# Patient Record
Sex: MALE | Race: WHITE | NOT HISPANIC OR LATINO
[De-identification: names, ages, dates, MRNs, and addresses within clinical notes are randomized per-mention and may not be internally consistent; named-entity substitution may affect disease eponyms.]

---

## 2021-05-27 ENCOUNTER — APPOINTMENT (OUTPATIENT)
Dept: COLORECTAL SURGERY | Facility: CLINIC | Age: 46
End: 2021-05-27
Payer: COMMERCIAL

## 2021-05-27 VITALS
HEART RATE: 81 BPM | TEMPERATURE: 97.5 F | BODY MASS INDEX: 24.11 KG/M2 | WEIGHT: 150 LBS | OXYGEN SATURATION: 99 % | SYSTOLIC BLOOD PRESSURE: 117 MMHG | DIASTOLIC BLOOD PRESSURE: 65 MMHG | RESPIRATION RATE: 14 BRPM | HEIGHT: 66 IN

## 2021-05-27 DIAGNOSIS — Z78.9 OTHER SPECIFIED HEALTH STATUS: ICD-10-CM

## 2021-05-27 DIAGNOSIS — L29.0 PRURITUS ANI: ICD-10-CM

## 2021-05-27 DIAGNOSIS — Z86.010 PERSONAL HISTORY OF COLONIC POLYPS: ICD-10-CM

## 2021-05-27 DIAGNOSIS — K62.5 HEMORRHAGE OF ANUS AND RECTUM: ICD-10-CM

## 2021-05-27 DIAGNOSIS — K64.9 UNSPECIFIED HEMORRHOIDS: ICD-10-CM

## 2021-05-27 PROCEDURE — 46600 DIAGNOSTIC ANOSCOPY SPX: CPT

## 2021-05-27 PROCEDURE — 99204 OFFICE O/P NEW MOD 45 MIN: CPT | Mod: 25

## 2021-05-27 RX ORDER — HYDROCORTISONE 25 MG/G
2.5 CREAM TOPICAL
Qty: 1 | Refills: 5 | Status: ACTIVE | COMMUNITY
Start: 2021-05-27 | End: 1900-01-01

## 2021-05-27 NOTE — ASSESSMENT
[FreeTextEntry1] : Internal hemorrhoids with occasional bleeding and itching\par -Patient's hemorrhoid disease appears to be mild to moderate and likely will respond to conservative therapy\par -Are recommended patient initiated fiber supplementation daily\par -Hydrocortisone cream 2 times per day for a week followed by as needed\par -Patient and monitor symptoms if no improvement, he will followup in the office for repeat evaluation\par -All questions were answered

## 2021-05-27 NOTE — PHYSICAL EXAM
[Normal Breath Sounds] : Normal breath sounds [Normal Heart Sounds] : normal heart sounds [Normal Rate and Rhythm] : normal rate and rhythm [No Rash or Lesion] : No rash or lesion [Alert] : alert [Oriented to Person] : oriented to person [Oriented to Place] : oriented to place [Oriented to Time] : oriented to time [Calm] : calm [de-identified] : soft, NT/ND, +BS [de-identified] : well nourished male [de-identified] : NC/AT [de-identified] : ABHAY/+ROM [de-identified] : Intact

## 2021-05-27 NOTE — CONSULT LETTER
[Dear  ___] : Dear  [unfilled], [Consult Letter:] : I had the pleasure of evaluating your patient, [unfilled]. [Please see my note below.] : Please see my note below. [Consult Closing:] : Thank you very much for allowing me to participate in the care of this patient.  If you have any questions, please do not hesitate to contact me. [Sincerely,] : Sincerely, [FreeTextEntry2] : Leif Cole [FreeTextEntry3] : Aaron Gray MD FACS\par Chief Colon and Rectal Surgery\par [facility]

## 2021-05-27 NOTE — HISTORY OF PRESENT ILLNESS
[FreeTextEntry1] : Patient is a 45 yo male here with complaints of hemorrhoids.  He states that he feels something on the outside, not really pain just discomfort especially when sitting.  He also complains of itching.  He passes stool 1-2 times a day and denies any straining.  He has very occasional scant bleeding with wiping. Recent colonoscopy was otherwise normal except for small polyp. This was removed. No bowel discomfort no fevers or chills no nausea or vomiting no weight loss or gain. No family history of colon cancer or inflammatory bowel disease

## 2021-06-13 ENCOUNTER — EMERGENCY (EMERGENCY)
Facility: HOSPITAL | Age: 46
LOS: 1 days | Discharge: HOME | End: 2021-06-13
Attending: EMERGENCY MEDICINE
Payer: COMMERCIAL

## 2021-06-13 VITALS
OXYGEN SATURATION: 99 % | RESPIRATION RATE: 20 BRPM | HEART RATE: 81 BPM | SYSTOLIC BLOOD PRESSURE: 119 MMHG | TEMPERATURE: 98 F | DIASTOLIC BLOOD PRESSURE: 80 MMHG

## 2021-06-13 VITALS
SYSTOLIC BLOOD PRESSURE: 105 MMHG | HEART RATE: 81 BPM | RESPIRATION RATE: 17 BRPM | DIASTOLIC BLOOD PRESSURE: 70 MMHG | TEMPERATURE: 98 F | OXYGEN SATURATION: 100 %

## 2021-06-13 LAB
ALBUMIN SERPL ELPH-MCNC: 3.6 G/DL — SIGNIFICANT CHANGE UP (ref 3.5–5)
ALP SERPL-CCNC: 76 U/L — SIGNIFICANT CHANGE UP (ref 40–120)
ALT FLD-CCNC: 33 U/L DA — SIGNIFICANT CHANGE UP (ref 10–60)
ANION GAP SERPL CALC-SCNC: 5 MMOL/L — SIGNIFICANT CHANGE UP (ref 5–17)
AST SERPL-CCNC: 28 U/L — SIGNIFICANT CHANGE UP (ref 10–40)
BASOPHILS # BLD AUTO: 0.03 K/UL — SIGNIFICANT CHANGE UP (ref 0–0.2)
BASOPHILS NFR BLD AUTO: 0.3 % — SIGNIFICANT CHANGE UP (ref 0–2)
BILIRUB SERPL-MCNC: 2 MG/DL — HIGH (ref 0.2–1.2)
BUN SERPL-MCNC: 13 MG/DL — SIGNIFICANT CHANGE UP (ref 7–18)
CALCIUM SERPL-MCNC: 8.1 MG/DL — LOW (ref 8.4–10.5)
CHLORIDE SERPL-SCNC: 106 MMOL/L — SIGNIFICANT CHANGE UP (ref 96–108)
CO2 SERPL-SCNC: 29 MMOL/L — SIGNIFICANT CHANGE UP (ref 22–31)
CREAT SERPL-MCNC: 0.92 MG/DL — SIGNIFICANT CHANGE UP (ref 0.5–1.3)
EOSINOPHIL # BLD AUTO: 0.09 K/UL — SIGNIFICANT CHANGE UP (ref 0–0.5)
EOSINOPHIL NFR BLD AUTO: 0.9 % — SIGNIFICANT CHANGE UP (ref 0–6)
GLUCOSE SERPL-MCNC: 129 MG/DL — HIGH (ref 70–99)
HCT VFR BLD CALC: 43.4 % — SIGNIFICANT CHANGE UP (ref 39–50)
HGB BLD-MCNC: 14.7 G/DL — SIGNIFICANT CHANGE UP (ref 13–17)
HIV 1 & 2 AB SERPL IA.RAPID: SIGNIFICANT CHANGE UP
IMM GRANULOCYTES NFR BLD AUTO: 0.3 % — SIGNIFICANT CHANGE UP (ref 0–1.5)
LIDOCAIN IGE QN: 95 U/L — SIGNIFICANT CHANGE UP (ref 73–393)
LYMPHOCYTES # BLD AUTO: 0.49 K/UL — LOW (ref 1–3.3)
LYMPHOCYTES # BLD AUTO: 4.9 % — LOW (ref 13–44)
MCHC RBC-ENTMCNC: 29.9 PG — SIGNIFICANT CHANGE UP (ref 27–34)
MCHC RBC-ENTMCNC: 33.9 GM/DL — SIGNIFICANT CHANGE UP (ref 32–36)
MCV RBC AUTO: 88.2 FL — SIGNIFICANT CHANGE UP (ref 80–100)
MONOCYTES # BLD AUTO: 1.06 K/UL — HIGH (ref 0–0.9)
MONOCYTES NFR BLD AUTO: 10.6 % — SIGNIFICANT CHANGE UP (ref 2–14)
NEUTROPHILS # BLD AUTO: 8.31 K/UL — HIGH (ref 1.8–7.4)
NEUTROPHILS NFR BLD AUTO: 83 % — HIGH (ref 43–77)
NRBC # BLD: 0 /100 WBCS — SIGNIFICANT CHANGE UP (ref 0–0)
PLATELET # BLD AUTO: 196 K/UL — SIGNIFICANT CHANGE UP (ref 150–400)
POTASSIUM SERPL-MCNC: 4.5 MMOL/L — SIGNIFICANT CHANGE UP (ref 3.5–5.3)
POTASSIUM SERPL-SCNC: 4.5 MMOL/L — SIGNIFICANT CHANGE UP (ref 3.5–5.3)
PROT SERPL-MCNC: 7.6 G/DL — SIGNIFICANT CHANGE UP (ref 6–8.3)
RBC # BLD: 4.92 M/UL — SIGNIFICANT CHANGE UP (ref 4.2–5.8)
RBC # FLD: 12 % — SIGNIFICANT CHANGE UP (ref 10.3–14.5)
SODIUM SERPL-SCNC: 140 MMOL/L — SIGNIFICANT CHANGE UP (ref 135–145)
WBC # BLD: 10.01 K/UL — SIGNIFICANT CHANGE UP (ref 3.8–10.5)
WBC # FLD AUTO: 10.01 K/UL — SIGNIFICANT CHANGE UP (ref 3.8–10.5)

## 2021-06-13 PROCEDURE — 83690 ASSAY OF LIPASE: CPT

## 2021-06-13 PROCEDURE — 86703 HIV-1/HIV-2 1 RESULT ANTBDY: CPT

## 2021-06-13 PROCEDURE — 85025 COMPLETE CBC W/AUTO DIFF WBC: CPT

## 2021-06-13 PROCEDURE — 93005 ELECTROCARDIOGRAM TRACING: CPT

## 2021-06-13 PROCEDURE — 96375 TX/PRO/DX INJ NEW DRUG ADDON: CPT

## 2021-06-13 PROCEDURE — 96374 THER/PROPH/DIAG INJ IV PUSH: CPT | Mod: XU

## 2021-06-13 PROCEDURE — 74177 CT ABD & PELVIS W/CONTRAST: CPT

## 2021-06-13 PROCEDURE — 76705 ECHO EXAM OF ABDOMEN: CPT | Mod: 26

## 2021-06-13 PROCEDURE — 99284 EMERGENCY DEPT VISIT MOD MDM: CPT | Mod: 25

## 2021-06-13 PROCEDURE — 80053 COMPREHEN METABOLIC PANEL: CPT

## 2021-06-13 PROCEDURE — 99285 EMERGENCY DEPT VISIT HI MDM: CPT

## 2021-06-13 PROCEDURE — 76705 ECHO EXAM OF ABDOMEN: CPT

## 2021-06-13 PROCEDURE — 93010 ELECTROCARDIOGRAM REPORT: CPT

## 2021-06-13 PROCEDURE — 74177 CT ABD & PELVIS W/CONTRAST: CPT | Mod: 26,ME

## 2021-06-13 PROCEDURE — G1004: CPT

## 2021-06-13 PROCEDURE — 36415 COLL VENOUS BLD VENIPUNCTURE: CPT

## 2021-06-13 RX ORDER — IOHEXOL 300 MG/ML
30 INJECTION, SOLUTION INTRAVENOUS ONCE
Refills: 0 | Status: COMPLETED | OUTPATIENT
Start: 2021-06-13 | End: 2021-06-13

## 2021-06-13 RX ORDER — KETOROLAC TROMETHAMINE 30 MG/ML
15 SYRINGE (ML) INJECTION ONCE
Refills: 0 | Status: DISCONTINUED | OUTPATIENT
Start: 2021-06-13 | End: 2021-06-13

## 2021-06-13 RX ORDER — SODIUM CHLORIDE 9 MG/ML
1000 INJECTION INTRAMUSCULAR; INTRAVENOUS; SUBCUTANEOUS ONCE
Refills: 0 | Status: COMPLETED | OUTPATIENT
Start: 2021-06-13 | End: 2021-06-13

## 2021-06-13 RX ORDER — ONDANSETRON 8 MG/1
4 TABLET, FILM COATED ORAL ONCE
Refills: 0 | Status: COMPLETED | OUTPATIENT
Start: 2021-06-13 | End: 2021-06-13

## 2021-06-13 RX ADMIN — Medication 15 MILLIGRAM(S): at 11:58

## 2021-06-13 RX ADMIN — SODIUM CHLORIDE 1000 MILLILITER(S): 9 INJECTION INTRAMUSCULAR; INTRAVENOUS; SUBCUTANEOUS at 11:00

## 2021-06-13 RX ADMIN — SODIUM CHLORIDE 1000 MILLILITER(S): 9 INJECTION INTRAMUSCULAR; INTRAVENOUS; SUBCUTANEOUS at 14:16

## 2021-06-13 RX ADMIN — SODIUM CHLORIDE 1000 MILLILITER(S): 9 INJECTION INTRAMUSCULAR; INTRAVENOUS; SUBCUTANEOUS at 12:00

## 2021-06-13 RX ADMIN — Medication 15 MILLIGRAM(S): at 12:30

## 2021-06-13 RX ADMIN — ONDANSETRON 4 MILLIGRAM(S): 8 TABLET, FILM COATED ORAL at 11:36

## 2021-06-13 RX ADMIN — IOHEXOL 30 MILLILITER(S): 300 INJECTION, SOLUTION INTRAVENOUS at 11:58

## 2021-06-13 NOTE — ED PROVIDER NOTE - CARE PLAN
Principal Discharge DX:	Non-intractable vomiting with nausea  Secondary Diagnosis:	Generalized abdominal pain

## 2021-06-13 NOTE — ED PROVIDER NOTE - OBJECTIVE STATEMENT
patient reports he went to bed last night in his usual state of health. Woke up at 5am vomiting, NBNB. Has had diffuse abdominal pain that has resolved. no fever, cp, sob, diarrhea, dysuria, urgency, frequency. Patient felt lightheaded after vomiting but denied syncope.

## 2021-06-13 NOTE — ED ADULT NURSE NOTE - PAIN RATING/NUMBER SCALE (0-10): ACTIVITY
7 Glycopyrrolate Counseling:  I discussed with the patient the risks of glycopyrrolate including but not limited to skin rash, drowsiness, dry mouth, difficulty urinating, and blurred vision.

## 2021-06-13 NOTE — ED PROVIDER NOTE - NSFOLLOWUPINSTRUCTIONS_ED_ALL_ED_FT
Acute Nausea and Vomiting    WHAT YOU NEED TO KNOW:    Acute nausea and vomiting start suddenly, worsen quickly, and last a short time.    DISCHARGE INSTRUCTIONS:    Return to the emergency department if:   •You see blood in your vomit or your bowel movements.      •You have sudden, severe pain in your chest and upper abdomen after hard vomiting or retching.      •You have swelling in your neck and chest.       •You are dizzy, cold, and thirsty and your eyes and mouth are dry.      •You are urinating very little or not at all.      •You have muscle weakness, leg cramps, and trouble breathing.       •Your heart is beating much faster than normal.       •You continue to vomit for more than 48 hours.       Contact your healthcare provider if:   •You have frequent dry heaves (vomiting but nothing comes out).      •Your nausea and vomiting does not get better or go away after you use medicine.      •You have questions or concerns about your condition or treatment.      Medicines: You may need any of the following:   •Medicines may be given to calm your stomach and stop your vomiting. You may also need medicines to help you feel more relaxed or to stop nausea and vomiting caused by motion sickness.      •Gastrointestinal stimulants are used to help empty your stomach and bowels. This may help decrease nausea and vomiting.      •Take your medicine as directed. Contact your healthcare provider if you think your medicine is not helping or if you have side effects. Tell him or her if you are allergic to any medicine. Keep a list of the medicines, vitamins, and herbs you take. Include the amounts, and when and why you take them. Bring the list or the pill bottles to follow-up visits. Carry your medicine list with you in case of an emergency.      Prevent or manage acute nausea and vomiting:   •Do not drink alcohol. Alcohol may upset or irritate your stomach. Too much alcohol can also cause acute nausea and vomiting.      •Control stress. Headaches due to stress may cause nausea and vomiting. Find ways to relax and manage your stress. Get more rest and sleep.      •Drink more liquids as directed. Vomiting can lead to dehydration. It is important to drink more liquids to help replace lost body fluids. Ask your healthcare provider how much liquid to drink each day and which liquids are best for you. Your provider may recommend that you drink an oral rehydration solution (ORS). ORS contains water, salts, and sugar that are needed to replace the lost body fluids. Ask what kind of ORS to use, how much to drink, and where to get it.      •Eat smaller meals, more often. Eat small amounts of food every 2 to 3 hours, even if you are not hungry. Food in your stomach may decrease your nausea.      •Talk to your healthcare provider before you take over-the-counter (OTC) medicines. These medicines can cause serious problems if you use certain other medicines, or you have a medical condition. You may have problems if you use too much or use them for longer than the label says. Follow directions on the label carefully.       Follow up with your healthcare provider as directed: Write down your questions so you remember to ask them during your follow-up visits.       © Copyright [employer] 2021           back to top                          © Copyright [employer] 2021

## 2021-06-13 NOTE — ED PROVIDER NOTE - PROGRESS NOTE DETAILS
Patient resting comfortably, feels mildly improved. Tbili elevated. Will start with US for possible GB pathology. If not diagnostic, will proceed to CT. Patient resting comfortably, feels moderately improved. US showed possible small GB polyps. Awaiting CT result. Patient resting comfortably, feels markedly improved. Now asymptomatic. Abdomen soft, non-tender, non-distended. Normal bowel sounds. No guarding or rebound. Patient reports he had an endoscopy 1 month ago by Dr. Cole for "internal bleeding." No bleeding today, no hematemesis, no BRBPR. Instructed to f/u with Dr. Cole this week and him PMD in 1-2 days.

## 2021-06-13 NOTE — ED PROVIDER NOTE - PATIENT PORTAL LINK FT
You can access the FollowMyHealth Patient Portal offered by [facility] by registering at the following website: [email or web address]. By joining [employer]’s FollowMyHealth portal, you will also be able to view your health information using other applications (apps) compatible with our system.

## 2022-11-13 ENCOUNTER — NON-APPOINTMENT (OUTPATIENT)
Age: 47
End: 2022-11-13

## 2024-08-08 ENCOUNTER — TRANSCRIPTION ENCOUNTER (OUTPATIENT)
Age: 49
End: 2024-08-08

## 2024-08-08 ENCOUNTER — EMERGENCY (EMERGENCY)
Facility: HOSPITAL | Age: 49
LOS: 1 days | Discharge: HOME | End: 2024-08-08
Attending: STUDENT IN AN ORGANIZED HEALTH CARE EDUCATION/TRAINING PROGRAM
Payer: COMMERCIAL

## 2024-08-08 VITALS
DIASTOLIC BLOOD PRESSURE: 80 MMHG | HEART RATE: 82 BPM | SYSTOLIC BLOOD PRESSURE: 146 MMHG | HEIGHT: 66 IN | TEMPERATURE: 99 F | WEIGHT: 147.49 LBS | OXYGEN SATURATION: 98 % | RESPIRATION RATE: 18 BRPM

## 2024-08-08 LAB
ALBUMIN SERPL ELPH-MCNC: 3.7 G/DL — SIGNIFICANT CHANGE UP (ref 3.5–5)
ALP SERPL-CCNC: 88 U/L — SIGNIFICANT CHANGE UP (ref 40–120)
ALT FLD-CCNC: 27 U/L DA — SIGNIFICANT CHANGE UP (ref 10–60)
ANION GAP SERPL CALC-SCNC: 4 MMOL/L — LOW (ref 5–17)
AST SERPL-CCNC: 17 U/L — SIGNIFICANT CHANGE UP (ref 10–40)
BASOPHILS # BLD AUTO: 0.05 K/UL — SIGNIFICANT CHANGE UP (ref 0–0.2)
BASOPHILS NFR BLD AUTO: 0.5 % — SIGNIFICANT CHANGE UP (ref 0–2)
BILIRUB SERPL-MCNC: 1.3 MG/DL — HIGH (ref 0.2–1.2)
BUN SERPL-MCNC: 9 MG/DL — SIGNIFICANT CHANGE UP (ref 7–18)
CALCIUM SERPL-MCNC: 8.8 MG/DL — SIGNIFICANT CHANGE UP (ref 8.4–10.5)
CHLORIDE SERPL-SCNC: 107 MMOL/L — SIGNIFICANT CHANGE UP (ref 96–108)
CO2 SERPL-SCNC: 26 MMOL/L — SIGNIFICANT CHANGE UP (ref 22–31)
CREAT SERPL-MCNC: 0.99 MG/DL — SIGNIFICANT CHANGE UP (ref 0.5–1.3)
EGFR: 95 ML/MIN/1.73M2 — SIGNIFICANT CHANGE UP
EOSINOPHIL # BLD AUTO: 0.48 K/UL — SIGNIFICANT CHANGE UP (ref 0–0.5)
EOSINOPHIL NFR BLD AUTO: 4.8 % — SIGNIFICANT CHANGE UP (ref 0–6)
FLUAV AG NPH QL: SIGNIFICANT CHANGE UP
FLUBV AG NPH QL: SIGNIFICANT CHANGE UP
GLUCOSE SERPL-MCNC: 106 MG/DL — HIGH (ref 70–99)
HCT VFR BLD CALC: 43.2 % — SIGNIFICANT CHANGE UP (ref 39–50)
HGB BLD-MCNC: 14.5 G/DL — SIGNIFICANT CHANGE UP (ref 13–17)
IMM GRANULOCYTES NFR BLD AUTO: 0.2 % — SIGNIFICANT CHANGE UP (ref 0–0.9)
LYMPHOCYTES # BLD AUTO: 1.67 K/UL — SIGNIFICANT CHANGE UP (ref 1–3.3)
LYMPHOCYTES # BLD AUTO: 16.7 % — SIGNIFICANT CHANGE UP (ref 13–44)
MCHC RBC-ENTMCNC: 30.3 PG — SIGNIFICANT CHANGE UP (ref 27–34)
MCHC RBC-ENTMCNC: 33.6 GM/DL — SIGNIFICANT CHANGE UP (ref 32–36)
MCV RBC AUTO: 90.4 FL — SIGNIFICANT CHANGE UP (ref 80–100)
MONOCYTES # BLD AUTO: 1.07 K/UL — HIGH (ref 0–0.9)
MONOCYTES NFR BLD AUTO: 10.7 % — SIGNIFICANT CHANGE UP (ref 2–14)
NEUTROPHILS # BLD AUTO: 6.7 K/UL — SIGNIFICANT CHANGE UP (ref 1.8–7.4)
NEUTROPHILS NFR BLD AUTO: 67.1 % — SIGNIFICANT CHANGE UP (ref 43–77)
NRBC # BLD: 0 /100 WBCS — SIGNIFICANT CHANGE UP (ref 0–0)
PLATELET # BLD AUTO: 233 K/UL — SIGNIFICANT CHANGE UP (ref 150–400)
POTASSIUM SERPL-MCNC: 4 MMOL/L — SIGNIFICANT CHANGE UP (ref 3.5–5.3)
POTASSIUM SERPL-SCNC: 4 MMOL/L — SIGNIFICANT CHANGE UP (ref 3.5–5.3)
PROT SERPL-MCNC: 7.9 G/DL — SIGNIFICANT CHANGE UP (ref 6–8.3)
RBC # BLD: 4.78 M/UL — SIGNIFICANT CHANGE UP (ref 4.2–5.8)
RBC # FLD: 11.9 % — SIGNIFICANT CHANGE UP (ref 10.3–14.5)
SARS-COV-2 RNA SPEC QL NAA+PROBE: SIGNIFICANT CHANGE UP
SODIUM SERPL-SCNC: 137 MMOL/L — SIGNIFICANT CHANGE UP (ref 135–145)
WBC # BLD: 9.99 K/UL — SIGNIFICANT CHANGE UP (ref 3.8–10.5)
WBC # FLD AUTO: 9.99 K/UL — SIGNIFICANT CHANGE UP (ref 3.8–10.5)

## 2024-08-08 PROCEDURE — 36415 COLL VENOUS BLD VENIPUNCTURE: CPT

## 2024-08-08 PROCEDURE — 99283 EMERGENCY DEPT VISIT LOW MDM: CPT | Mod: 25

## 2024-08-08 PROCEDURE — 85025 COMPLETE CBC W/AUTO DIFF WBC: CPT

## 2024-08-08 PROCEDURE — 87636 SARSCOV2 & INF A&B AMP PRB: CPT

## 2024-08-08 PROCEDURE — 71046 X-RAY EXAM CHEST 2 VIEWS: CPT | Mod: 26

## 2024-08-08 PROCEDURE — 99284 EMERGENCY DEPT VISIT MOD MDM: CPT

## 2024-08-08 PROCEDURE — 80053 COMPREHEN METABOLIC PANEL: CPT

## 2024-08-08 PROCEDURE — 71046 X-RAY EXAM CHEST 2 VIEWS: CPT

## 2024-08-08 RX ORDER — KETOROLAC TROMETHAMINE 10 MG
15 TABLET ORAL ONCE
Refills: 0 | Status: DISCONTINUED | OUTPATIENT
Start: 2024-08-08 | End: 2024-08-08

## 2024-08-08 RX ADMIN — Medication 15 MILLIGRAM(S): at 20:21

## 2024-08-08 RX ADMIN — Medication 15 MILLIGRAM(S): at 19:51

## 2024-08-08 NOTE — ED ADULT NURSE NOTE - OBJECTIVE STATEMENT
Pt presents to the ED with c/o fever, chills, cough, body aches, and sob x 1 week. No respiratory distress noted.

## 2024-08-08 NOTE — ED PROVIDER NOTE - PROGRESS NOTE DETAILS
Rosangela Oneal (Rodriguez), DO all questions answered. labs and XR reassuring. pt and family aware. azithromycin already provided by PCP. RTED precautions given.

## 2024-08-08 NOTE — ED PROVIDER NOTE - NS ED ROS FT
Constitutional: +fevers, chills  HEENT: no HA, vision changes, rhinorrhea, +sore throat  Cardiac: no chest pain, palpitations  Respiratory: no SOB, +cough no hemoptysis  GI: no n/v/d/c, abd pain, bloody or dark stools  : no dysuria, frequency, or hematuria  MSK: no joint pain, neck pain or back pain  Skin: no rashes, jaundice, pruritis  Neuro: no numbness/tingling, weakness, unsteady gait  ROS otherwise neg except per MDM

## 2024-08-08 NOTE — ED PROVIDER NOTE - NSFOLLOWUPINSTRUCTIONS_ED_ALL_ED_FT
You were seen in the Emergency Department for cough.     1) Advance activity as tolerated.   2) Continue all previously prescribed medications as directed.    3) Follow up with your primary care physician in 24-48 hours - take copies of your results.    4) Return to the Emergency Department for worsening or persistent symptoms, and/or ANY NEW OR CONCERNING SYMPTOMS including chest pain leg swelling coughing up blood shortness of breath, etc.     for pain and fever:   you can take tylenol/acetaminophen 975mg every 6 hours for pain (do not exceed 1000mg per dose or 4000mg per day). be sure that any other medications you are taking do not contain tylenol/acetaminophen. if other medications do include tylenol/acetaminophen, be sure to include this in your calculations of one time dose and daily dose.     you can also take motrin/ibuprofen 600mg for pain.     acetaminophen/tylenol can be taken at the same time as motrin/ibuprofen for maximum pain relief or 3 hours apart for continuous pain relief. You were seen in the Emergency Department for cough.     1) Advance activity as tolerated.   2) Continue all previously prescribed medications as directed.    3) Follow up with your primary care physician in 24-48 hours - take copies of your results.    4) Return to the Emergency Department for worsening or persistent symptoms, and/or ANY NEW OR CONCERNING SYMPTOMS including chest pain leg swelling coughing up blood shortness of breath, etc.     for pain and fever:   you can take tylenol/acetaminophen 975mg every 6 hours for pain (do not exceed 1000mg per dose or 4000mg per day). be sure that any other medications you are taking do not contain tylenol/acetaminophen. if other medications do include tylenol/acetaminophen, be sure to include this in your calculations of one time dose and daily dose.     you can also take motrin/ibuprofen 600mg for pain.     acetaminophen/tylenol can be taken at the same time as motrin/ibuprofen for maximum pain relief or 3 hours apart for continuous pain relief.    stay hydrated at home with water, gatorade, soups.     honey is proven to decrease the amount and severity of cough. take a tablespoon as needed.     benadryl at night 25-50mg before bed can help with sleep and cough as needed as well.     if you so choose, you can also take vitamin c and zinc supplements over the counter to boost immunity.

## 2024-08-08 NOTE — ED ADULT TRIAGE NOTE - IDEAL BODY WEIGHT(KG)
PATIENT INSTRUCTIONS    Treatment:  CT [facility]      PENDING APPROVAL  [address] [place] [address]    COMPUTED TOMOGRAPHY (CT)    IF YOUR INSURANCE REQUIRES PRE AUTHORIZATION, PLEASE WAIT 48-72 HOURS TO CALL TO SCHEDULE YOUR CT.   Please call [phone] to schedule your CT Scan  Schedulers are available Monday through Friday, 8:00 a.m. to 5:00 p.m.    Please come to Radiology (on the lower level of [place]) before or at your appointment time.  This will allow sufficient time for us to prepare for your exam and to begin your exam as close to the appointment time as possible.  If you need to change your appointment or have questions, please call us at [phone].    What is CT Scanning?  CT scanning, also called computerized tomography or \"CAT\" scan, is an x-ray test used for diagnosis.  Scans are taken from a series of different angles and arranged by a computer to show a cross-sectional view of organs in the body.    When is it used?  CT scans are used when your health care provider needs more detailed information than regular x-rays provide.      How do I prepare for a CT scan?  No preparation is necessary unless your health care provider gives you special instructions.  For example, if you are having a CT scan of your abdomen or pelvis, you should not eat solid food for five hours before the scan and will probably need to drink oral contrast.  Before the test, you may be asked to remove dental wear, piercings, hair clips, or jewelry you are wearing. If you have a mediport, you will be asked to have the port accessed prior to your CT appointment.     What happens during the procedure?  During your scan, you will lie down on a moving table, which will slide you into the scanner.  The scanner can move around you to change the angles of the x-rays.  Inside the scanning machine, multiple x-ray beams are passed very quickly through your body at  different angles. The images are projected onto a TV screen and prepared for your health care provider to examine.     A solution of dye (also called contrast) may be injected into a vein, or you may be asked to drink a different type of dye. This allows the scanner to show areas as the dye passes through your body.     Your CT scan will last approximately 15 to 30 minutes.  The scanning procedure is painless, but you will be required to remain in one position for a period of time. You can talk to the technologist at any time during the procedure.    What are the risks associated with this procedure?  In this procedure, your body is exposed to a very small amount of radiation.  If you are pregnant, you should not have a CT scan without first discussing the possible risks with your health care provider.     During this procedure, the dye may cause warm feelings, a flushed face, headache, or a salty taste in the mouth. Rarely, it can cause nausea and vomiting, or more severe reactions.     Be sure to tell your health care provider if you are allergic to any medicines or chemicals such as iodine.       -CONTINUE WITH BONE STI  -NORCO REFILL  -CONTINUE WITH COMPRESSION STOCKINGS  -SAME WORK RESTRITIONS      Follow-Up:  Please make an appointment with  Dr. Conner Mcclain in 5-6 MONTHS  Call or return to the clinic as needed if these symptoms worsen fail to improve as anticipated, or if new symptoms develop.          Referral:       Time left: 12/21/2022 9:19 AM     Next appointment:        Location:        Provider:        Please note: 24 hour notice for cancellation of appointment is required.    You may receive a survey in the mail, or via the e-mail address that you have provided.  We would appreciate if you could fill out the survey and provide us with any feedback on your experience regarding your visit today. Thank you for allowing us to provide you with your health care needs.     Do not hesitate to call if you are  experiencing severe pain, worsening or change in your pain, have symptoms of infection (fever, warmth, redness, increased drainage), or have any other problem that concerns you ~ [phone] (or [phone] after hours).    Please remember when requesting refills on pain medication that the request should be made by Thursday at the latest. [facility] is open Monday-Friday, 8am-5pm, and closed on the weekends.  No narcotic refills will be filled after hours.    Additional Educational Resources:  For additional resources regarding your symptoms, diagnosis, or further health information, please visit the Health Resources section on [email or web address] or the Online Health Resources section in [email or web address].        64

## 2024-08-08 NOTE — ED PROVIDER NOTE - PATIENT PORTAL LINK FT
You can access the FollowMyHealth Patient Portal offered by [facility] by registering at the following website: [email or web address]. By joining [employer]’s FollowMyHealth portal, you will also be able to view your health information using other applications (apps) compatible with our system.

## 2024-08-08 NOTE — ED PROVIDER NOTE - PHYSICAL EXAMINATION
General: non-toxic, NAD  HEENT: NCAT, PERRL, moist mucus membranes, no tonsillar exudate or enlargement.   Cardiac: RRR, no murmurs, 2+ peripheral pulses  Resp: CTAB  Abdomen: soft, non-distended, bowel sounds present, no ttp, no rebound or guarding. no organomegaly  Extremities: no peripheral edema, calf tenderness, or leg size discrepancies  Skin: no rashes  Neuro: AAOx4, 5+motor, sensation grossly intact  Psych: mood and affect appropriate

## 2024-08-09 PROBLEM — Z78.9 OTHER SPECIFIED HEALTH STATUS: Chronic | Status: ACTIVE | Noted: 2021-06-13
